# Patient Record
Sex: MALE | Race: OTHER | HISPANIC OR LATINO | URBAN - METROPOLITAN AREA
[De-identification: names, ages, dates, MRNs, and addresses within clinical notes are randomized per-mention and may not be internally consistent; named-entity substitution may affect disease eponyms.]

---

## 2023-07-13 ENCOUNTER — EMERGENCY (EMERGENCY)
Facility: HOSPITAL | Age: 38
LOS: 1 days | Discharge: HOME | End: 2023-07-13
Attending: EMERGENCY MEDICINE
Payer: MEDICAID

## 2023-07-13 VITALS
RESPIRATION RATE: 18 BRPM | DIASTOLIC BLOOD PRESSURE: 85 MMHG | OXYGEN SATURATION: 95 % | HEART RATE: 94 BPM | TEMPERATURE: 98 F | WEIGHT: 179.9 LBS | HEIGHT: 66 IN | SYSTOLIC BLOOD PRESSURE: 132 MMHG

## 2023-07-13 LAB
ALBUMIN SERPL ELPH-MCNC: 4.3 G/DL — SIGNIFICANT CHANGE UP (ref 3.3–5.2)
ALP SERPL-CCNC: 86 U/L — SIGNIFICANT CHANGE UP (ref 40–120)
ALT FLD-CCNC: 19 U/L — SIGNIFICANT CHANGE UP
ANION GAP SERPL CALC-SCNC: 12 MMOL/L — SIGNIFICANT CHANGE UP (ref 5–17)
AST SERPL-CCNC: 27 U/L — SIGNIFICANT CHANGE UP
BASOPHILS # BLD AUTO: 0.04 K/UL — SIGNIFICANT CHANGE UP (ref 0–0.2)
BASOPHILS NFR BLD AUTO: 0.3 % — SIGNIFICANT CHANGE UP (ref 0–2)
BILIRUB SERPL-MCNC: 0.8 MG/DL — SIGNIFICANT CHANGE UP (ref 0.4–2)
BUN SERPL-MCNC: 12 MG/DL — SIGNIFICANT CHANGE UP (ref 8–20)
CALCIUM SERPL-MCNC: 9.4 MG/DL — SIGNIFICANT CHANGE UP (ref 8.4–10.5)
CHLORIDE SERPL-SCNC: 102 MMOL/L — SIGNIFICANT CHANGE UP (ref 96–108)
CO2 SERPL-SCNC: 26 MMOL/L — SIGNIFICANT CHANGE UP (ref 22–29)
CREAT SERPL-MCNC: 0.93 MG/DL — SIGNIFICANT CHANGE UP (ref 0.5–1.3)
CRP SERPL-MCNC: 139 MG/L — HIGH
EGFR: 108 ML/MIN/1.73M2 — SIGNIFICANT CHANGE UP
EOSINOPHIL # BLD AUTO: 0.09 K/UL — SIGNIFICANT CHANGE UP (ref 0–0.5)
EOSINOPHIL NFR BLD AUTO: 0.6 % — SIGNIFICANT CHANGE UP (ref 0–6)
ERYTHROCYTE [SEDIMENTATION RATE] IN BLOOD: 26 MM/HR — HIGH (ref 0–20)
GLUCOSE SERPL-MCNC: 88 MG/DL — SIGNIFICANT CHANGE UP (ref 70–99)
HCT VFR BLD CALC: 43.1 % — SIGNIFICANT CHANGE UP (ref 39–50)
HGB BLD-MCNC: 14.8 G/DL — SIGNIFICANT CHANGE UP (ref 13–17)
IMM GRANULOCYTES NFR BLD AUTO: 0.6 % — SIGNIFICANT CHANGE UP (ref 0–0.9)
LACTATE BLDV-MCNC: 1.1 MMOL/L — SIGNIFICANT CHANGE UP (ref 0.5–2)
LYMPHOCYTES # BLD AUTO: 1.85 K/UL — SIGNIFICANT CHANGE UP (ref 1–3.3)
LYMPHOCYTES # BLD AUTO: 13.1 % — SIGNIFICANT CHANGE UP (ref 13–44)
MCHC RBC-ENTMCNC: 30.7 PG — SIGNIFICANT CHANGE UP (ref 27–34)
MCHC RBC-ENTMCNC: 34.3 GM/DL — SIGNIFICANT CHANGE UP (ref 32–36)
MCV RBC AUTO: 89.4 FL — SIGNIFICANT CHANGE UP (ref 80–100)
MONOCYTES # BLD AUTO: 1.1 K/UL — HIGH (ref 0–0.9)
MONOCYTES NFR BLD AUTO: 7.8 % — SIGNIFICANT CHANGE UP (ref 2–14)
NEUTROPHILS # BLD AUTO: 10.93 K/UL — HIGH (ref 1.8–7.4)
NEUTROPHILS NFR BLD AUTO: 77.6 % — HIGH (ref 43–77)
PLATELET # BLD AUTO: 236 K/UL — SIGNIFICANT CHANGE UP (ref 150–400)
POTASSIUM SERPL-MCNC: 4.4 MMOL/L — SIGNIFICANT CHANGE UP (ref 3.5–5.3)
POTASSIUM SERPL-SCNC: 4.4 MMOL/L — SIGNIFICANT CHANGE UP (ref 3.5–5.3)
PROT SERPL-MCNC: 7.4 G/DL — SIGNIFICANT CHANGE UP (ref 6.6–8.7)
RBC # BLD: 4.82 M/UL — SIGNIFICANT CHANGE UP (ref 4.2–5.8)
RBC # FLD: 12.8 % — SIGNIFICANT CHANGE UP (ref 10.3–14.5)
SODIUM SERPL-SCNC: 140 MMOL/L — SIGNIFICANT CHANGE UP (ref 135–145)
WBC # BLD: 14.09 K/UL — HIGH (ref 3.8–10.5)
WBC # FLD AUTO: 14.09 K/UL — HIGH (ref 3.8–10.5)

## 2023-07-13 PROCEDURE — 99223 1ST HOSP IP/OBS HIGH 75: CPT

## 2023-07-13 PROCEDURE — 73564 X-RAY EXAM KNEE 4 OR MORE: CPT | Mod: 26,LT

## 2023-07-13 RX ORDER — IBUPROFEN 200 MG
600 TABLET ORAL EVERY 6 HOURS
Refills: 0 | Status: DISCONTINUED | OUTPATIENT
Start: 2023-07-13 | End: 2023-07-20

## 2023-07-13 RX ORDER — AMPICILLIN SODIUM AND SULBACTAM SODIUM 250; 125 MG/ML; MG/ML
3 INJECTION, POWDER, FOR SUSPENSION INTRAMUSCULAR; INTRAVENOUS ONCE
Refills: 0 | Status: COMPLETED | OUTPATIENT
Start: 2023-07-13 | End: 2023-07-13

## 2023-07-13 RX ORDER — ACETAMINOPHEN 500 MG
650 TABLET ORAL EVERY 6 HOURS
Refills: 0 | Status: DISCONTINUED | OUTPATIENT
Start: 2023-07-13 | End: 2023-07-20

## 2023-07-13 RX ORDER — AMPICILLIN SODIUM AND SULBACTAM SODIUM 250; 125 MG/ML; MG/ML
3 INJECTION, POWDER, FOR SUSPENSION INTRAMUSCULAR; INTRAVENOUS EVERY 6 HOURS
Refills: 0 | Status: DISCONTINUED | OUTPATIENT
Start: 2023-07-14 | End: 2023-07-20

## 2023-07-13 RX ORDER — KETOROLAC TROMETHAMINE 30 MG/ML
30 SYRINGE (ML) INJECTION ONCE
Refills: 0 | Status: DISCONTINUED | OUTPATIENT
Start: 2023-07-13 | End: 2023-07-13

## 2023-07-13 RX ORDER — OXYCODONE HYDROCHLORIDE 5 MG/1
5 TABLET ORAL EVERY 8 HOURS
Refills: 0 | Status: DISCONTINUED | OUTPATIENT
Start: 2023-07-13 | End: 2023-07-15

## 2023-07-13 RX ORDER — AMPICILLIN SODIUM AND SULBACTAM SODIUM 250; 125 MG/ML; MG/ML
INJECTION, POWDER, FOR SUSPENSION INTRAMUSCULAR; INTRAVENOUS
Refills: 0 | Status: DISCONTINUED | OUTPATIENT
Start: 2023-07-13 | End: 2023-07-20

## 2023-07-13 RX ADMIN — AMPICILLIN SODIUM AND SULBACTAM SODIUM 3 GRAM(S): 250; 125 INJECTION, POWDER, FOR SUSPENSION INTRAMUSCULAR; INTRAVENOUS at 20:28

## 2023-07-13 RX ADMIN — Medication 30 MILLIGRAM(S): at 17:55

## 2023-07-13 RX ADMIN — AMPICILLIN SODIUM AND SULBACTAM SODIUM 200 GRAM(S): 250; 125 INJECTION, POWDER, FOR SUSPENSION INTRAMUSCULAR; INTRAVENOUS at 13:27

## 2023-07-13 RX ADMIN — AMPICILLIN SODIUM AND SULBACTAM SODIUM 200 GRAM(S): 250; 125 INJECTION, POWDER, FOR SUSPENSION INTRAMUSCULAR; INTRAVENOUS at 19:58

## 2023-07-13 RX ADMIN — Medication 650 MILLIGRAM(S): at 19:58

## 2023-07-13 RX ADMIN — OXYCODONE HYDROCHLORIDE 5 MILLIGRAM(S): 5 TABLET ORAL at 20:58

## 2023-07-13 RX ADMIN — Medication 30 MILLIGRAM(S): at 13:28

## 2023-07-13 RX ADMIN — AMPICILLIN SODIUM AND SULBACTAM SODIUM 3 GRAM(S): 250; 125 INJECTION, POWDER, FOR SUSPENSION INTRAMUSCULAR; INTRAVENOUS at 19:17

## 2023-07-13 RX ADMIN — OXYCODONE HYDROCHLORIDE 5 MILLIGRAM(S): 5 TABLET ORAL at 19:58

## 2023-07-13 RX ADMIN — Medication 650 MILLIGRAM(S): at 20:58

## 2023-07-13 NOTE — ED PROVIDER NOTE - CLINICAL SUMMARY MEDICAL DECISION MAKING FREE TEXT BOX
37 yo M no PMHx presents for L knee pain and swelling x 2 days. Pt is well appearing, afebrile and is able to ambulate. No visual defects or trauma seen to the L knee. Less concern for septic arthritis as pt has ROM. Clinical picture and exam more likely for septic bursitis. Will order full labs, Xray L knee, IV abx, and considering observation. Will cont to observe. 39 yo M no PMHx presents for L knee pain and swelling x 2 days. Pt is well appearing, afebrile and is able to ambulate. Less concern for septic arthritis as pt without hardware/previous surgery, is ambulatory and has range of motion. Clinical picture and exam more likely for septic bursitis. Will evaluate with labs, Xray L knee, IV abx, and consider obs 39 yo M no PMHx presents for L knee pain and swelling x 2 days. Pt is well appearing, afebrile and is able to ambulate. Less concern for septic arthritis as pt without hardware/previous surgery, is ambulatory and has range of motion. Clinical picture and exam more likely for septic bursitis. Will evaluate with labs, Xray L knee, IV abx, and consider obs. leukocytosis 14k, otherwise labs without significant findings. XR reviewed. given dose unasyn in ED. ortho consulted, agrees with plan for overnight iv abx. pt agreeable to stay in obs for further management

## 2023-07-13 NOTE — CONSULT NOTE ADULT - SUBJECTIVE AND OBJECTIVE BOX
Pt Name: RAPHAEL MAYO    MRN: [id number]      Patient is a 38y Male presenting to the emergency department with a chief complaint of left knee pain. Symptoms began over the last 3 days. Patient denies any trauma or inciting event. No prior history of knee problems. Patient works as a  and spends a lot of time on his knees. Patient complains of significant pain, however he was able to walk from waiting room to his current location in the ED. Associated symptoms include redness, swelling, and stiffness. No additional orthopedic complaints are expressed at this time. Patient denies fever, chills, malaise, numbness or weakness.    Luxembourger interpretation provided by ED .       REVIEW OF SYSTEMS    Musculoskeletal: SEE HPI.    Neurological: No sensory or motor changes.     ROS is otherwise negative.    PAST MEDICAL & SURGICAL HISTORY:    No pertinent past medical history      No significant past surgical history          Allergies: No Known Allergies      Medications:     FAMILY HISTORY:  No pertinent family history in first degree relatives    : non-contributory    Social History:     Ambulation: Walking independently.                          14.8   14.09 )-----------( 236      ( 13 Jul 2023 12:30 )             43.1       07-13    140  |  102  |  12.0  ----------------------------<  88  4.4   |  26.0  |  0.93    Ca    9.4      13 Jul 2023 12:30    TPro  7.4  /  Alb  4.3  /  TBili  0.8  /  DBili  x   /  AST  27  /  ALT  19  /  AlkPhos  86  07-13      Vital Signs Last 24 Hrs  T(C): 36.4 (13 Jul 2023 09:49), Max: 36.4 (13 Jul 2023 09:49)  T(F): 97.6 (13 Jul 2023 09:49), Max: 97.6 (13 Jul 2023 09:49)  HR: 94 (13 Jul 2023 09:49) (94 - 94)  BP: 132/85 (13 Jul 2023 09:49) (132/85 - 132/85)  BP(mean): --  RR: 18 (13 Jul 2023 09:49) (18 - 18)  SpO2: 95% (13 Jul 2023 09:49) (95% - 95%)    Parameters below as of 13 Jul 2023 09:49  Patient On (Oxygen Delivery Method): room air        Daily Height in cm: 167.64 (13 Jul 2023 09:49)    Daily       PHYSICAL EXAM:      Appearance: Alert, responsive, in no acute distress.    Musculoskeletal:         Left Lower Extremity: skin warm and intact. +Erythema overlying anterior knee. No deformity or ecchymosis. Mild swelling noted overlying pre-patellar bursa. Global TTP about knee. ROM: 0-90. Negative log roll. No appreciable effusion. SILT. +TA/GSC/EHL/FHL. BCR.       Imaging Studies: No acute fractures or dislocations.     A/P: Pt is a 38y Male with septic pre-patellar bursitis.     PLAN:   -Pain control as needed.  -WBAT.  -Imaging reviewed.  -Recommend admission to CDU form observation and antibiotics.   -IV Abx per ED.   -Consider ID consult.   -Arthrocentesis not indicated at this time.   -Will continue to monitor.   -Obtain CT or US to eval for fluid collection if no improvement despite the above,  -Case d/w Dr. Abdi.        Pt Name: RAPHAEL MAYO    MRN: [id number]      Patient is a 38y Male presenting to the emergency department with a chief complaint of left knee pain. Symptoms began over the last 3 days. Patient denies any trauma or inciting event. No prior history of knee problems. Patient works as a  and spends a lot of time on his knees. Patient complains of significant pain, however he was able to walk from waiting room to his current location in the ED. Associated symptoms include redness, swelling, and stiffness. No additional orthopedic complaints are expressed at this time. Patient denies fever, chills, malaise, numbness or weakness.    Faroese interpretation provided by ED .       REVIEW OF SYSTEMS    Musculoskeletal: SEE HPI.    Neurological: No sensory or motor changes.     ROS is otherwise negative.    PAST MEDICAL & SURGICAL HISTORY:    No pertinent past medical history      No significant past surgical history          Allergies: No Known Allergies      Medications:     FAMILY HISTORY:  No pertinent family history in first degree relatives    : non-contributory    Social History:     Ambulation: Walking independently.                          14.8   14.09 )-----------( 236      ( 13 Jul 2023 12:30 )             43.1       07-13    140  |  102  |  12.0  ----------------------------<  88  4.4   |  26.0  |  0.93    Ca    9.4      13 Jul 2023 12:30    TPro  7.4  /  Alb  4.3  /  TBili  0.8  /  DBili  x   /  AST  27  /  ALT  19  /  AlkPhos  86  07-13      Vital Signs Last 24 Hrs  T(C): 36.4 (13 Jul 2023 09:49), Max: 36.4 (13 Jul 2023 09:49)  T(F): 97.6 (13 Jul 2023 09:49), Max: 97.6 (13 Jul 2023 09:49)  HR: 94 (13 Jul 2023 09:49) (94 - 94)  BP: 132/85 (13 Jul 2023 09:49) (132/85 - 132/85)  BP(mean): --  RR: 18 (13 Jul 2023 09:49) (18 - 18)  SpO2: 95% (13 Jul 2023 09:49) (95% - 95%)    Parameters below as of 13 Jul 2023 09:49  Patient On (Oxygen Delivery Method): room air        Daily Height in cm: 167.64 (13 Jul 2023 09:49)    Daily       PHYSICAL EXAM:      Appearance: Alert, responsive, in no acute distress.    Musculoskeletal:         Left Lower Extremity: skin warm and intact. +Erythema overlying anterior knee. No deformity or ecchymosis. Mild swelling noted overlying pre-patellar bursa. Global TTP about knee. ROM: 0-90. Negative log roll. No appreciable effusion. SILT. +TA/GSC/EHL/FHL. BCR.       Imaging Studies: No acute fractures or dislocations.     A/P: Pt is a 38y Male with septic pre-patellar bursitis.     PLAN:   -Pain control as needed.  -WBAT.  -Imaging reviewed.  -Recommend admission to CDU form observation and antibiotics.   -IV Abx per ED.   -Consider ID consult.   -Arthrocentesis not indicated at this time.   -Will continue to monitor.   -Obtain CT or US to eval for fluid collection if no improvement despite the above.  -Trend ESR/CRP.  -Case d/w Dr. Abdi.

## 2023-07-13 NOTE — ED CDU PROVIDER INITIAL DAY NOTE - CLINICAL SUMMARY MEDICAL DECISION MAKING FREE TEXT BOX
Pt presenting for knee pain. Found to have likely septic bursitis of the pre patellar bursa. No drainage performed. Septic arthritis less likely given pt able to range between extremes. Will continue antibiotics.

## 2023-07-13 NOTE — ED PROVIDER NOTE - PHYSICAL EXAMINATION
GEN: CLARENCE, well appearing   HEENT: NC/AT, PERRLA  NECK: Trachea midline   RESP: CTAB  CV: RRR  ABD: soft, nontender, nondistended   EXT: +localized L knee edema, +tenderness to palpation L knee, no abrasions, rashes, lesions, equal strength B/L UE and LE   NEURO: Intact motor and sensory sensation B/L UE AND LE  SKIN: +L knee erythema and warmth, clean and dry, no rashes, lesions Gen: No acute distress, non toxic  HENT: NCAT, Mucous membranes moist  Eyes: pink conjunctivae, EOMI, PERRL  CV: RRR, nl s1/s2.  Resp: CTAB, normal rate and effort  Neuro: A&O x 3, sensorimotor intact without deficits   MSK: +Swelling to anterior L knee. Able to fully extend LLE @ knee, +Pain with knee flexion beyond 30 degrees. +TTP anterior knee. compartments soft/compressible. FWB and ambulating   Skin: +Erythema and warmth to anterior and medial L knee

## 2023-07-13 NOTE — ED CDU PROVIDER INITIAL DAY NOTE - ATTENDING CONTRIBUTION TO CARE
Patient in obs for septic pre patellar bursitis in need of IV abx and wound monitoring.   present for discussions with patient. VSS.  NV intact.  will continue to treat and monitor.  Non toxic.  Well appearing. Uneventful ED observation period.

## 2023-07-13 NOTE — ED ADULT NURSE REASSESSMENT NOTE - NS ED NURSE REASSESS COMMENT FT1
C/p assumed from Dona FIGUEROA @1915. no S&S of acute distress, pt resting comfortably on stretcher. pt denies CP/pressure/tightness, palpitations, SOB/dyspnea, dizziness/headache/lightheadedness/blurry vision, tingling/numbness, fevers/chills, N/V/D, urinary S&S. c/o pain to left knee. awaiting pain medication orders. left knee noted to be reddened, warm, and swollen since yesterday morning. as per pt, the pain began two day. pain is described pulsating, but after the anitbitoic the pain has improved. awaiting continuation of iv antibiotics. plan of care reviewed with pt and Language Line #[id number]. pt in understanding of plan of care.

## 2023-07-13 NOTE — ED CDU PROVIDER INITIAL DAY NOTE - PHYSICAL EXAMINATION
General: well appearing, NAD  Head: NC, AT  EENT: EOMI, no scleral icterus  Cardiac: RRR, no apparent murmurs, no lower extremity edema  Respiratory: CTABL, no respiratory distress   Abdomen: soft, ND, NT, nonperitonitic  MSK/Vascular: full ROM, soft compartments, warm extremities, ROM limited by pain at the extremes of movement  Neuro: AAOx3, sensation to light touch intact  Psych: calm, cooperative

## 2023-07-13 NOTE — CONSULT NOTE ADULT - NS ATTEND AMEND GEN_ALL_CORE FT
Orthopaedic Trauma Surgeon Addendum:    I have reviewed the physician assistant note and agree with the history, exam, and plan of care, except as noted.    Ortho will continue to follow. Please call with questions.    Venkata Abdi MD  Orthopaedic Trauma Surgeon  [facility] [place]

## 2023-07-13 NOTE — ED PROVIDER NOTE - ATTENDING CONTRIBUTION TO CARE
significant prepatellar bursitis with assoc cellulitis streaking down leg; no fever; +erythema over patellar region and assoc streaking erythema to lower leg; +ttp; no joint effusion; limited ROM due to pain; +neurovasc intact; agree with acp plan of care    This was a shared visit with SAMIR. I reviewed and verified the documentation and independently performed the documented history/exam/mdm.

## 2023-07-13 NOTE — ED CDU PROVIDER INITIAL DAY NOTE - OBJECTIVE STATEMENT
38 y m with no pmh presenting for left knee pain. Started 2 days ago with just pain and pt had progressively worsening redness and swelling. Redness is now encompassing an area with diameter of 6 in at the left anteromedial knee. Pt denies fever, chills, nausea, vomiting, abdominal pain, lightheadedness. Pt has pain with ROM but predominantly at the extremes. Pt was seen by ortho 38 y m with no pmh presenting for left knee pain. Started 2 days ago with just pain and pt had progressively worsening redness and swelling. Redness is now encompassing an area with diameter of 6 in at the left anteromedial knee. Pt denies fever, chills, nausea, vomiting, abdominal pain, lightheadedness. Pt has pain with ROM but predominantly at the extremes. Labs remarkable for WBC of 14, crp of 139, and sed rate of 26. Lactate 1.1. Knee xrays shows swelling in anterior patellar region. Pt has seen by ortho. No intervention beyond antibiotics at this time. Pt has received unasyn x1.

## 2023-07-13 NOTE — ED PROVIDER NOTE - OBJECTIVE STATEMENT
39 yo M no PMHx presents for L knee pain and swelling x 2 days. Pt states he woke up with nonradiating, throbbing L knee pain and swelling 2 days ago that progressively worsened to today to 9/10 worsened by walking/bending the knee and improvement with laying down. Pt tried a OTC RA drug with no improvement. Has never experienced this prior. Pt stayed home from work as a  yesterday due to pain. Denies any trauma. Denies recent travel, fever, chills, HA, visual changes, CP, SOB, abd pain, N/V, urinary changes. 39 yo M no PMHx presents to ED c/o L knee pain and swelling x 2 days. Pt states he woke up with nonradiating, throbbing L knee pain and swelling 2 days ago that progressively worsened to today to 9/10 worsened by walking/bending the knee and improvement with laying down. Reports redness to knee that began this morning. Pt tried a OTC rheumatoid arthritis drug (unsure of name) with no improvement. Has never experienced this prior. Pt stayed home from work as a  yesterday due to pain. Denies any trauma. Denies recent travel, fever, chills, HA, visual changes, CP, SOB, abd pain, N/V, urinary changes, calf pain/swelling.  : Jayson Rashid

## 2023-07-13 NOTE — ED PROVIDER NOTE - NS ED ATTENDING STATEMENT MOD
I have seen and examined this patient and fully participated in the care of this patient as the teaching attending.  The service was shared with the SAMIR.  I reviewed and verified the documentation and independently performed the documented:

## 2023-07-13 NOTE — ED CDU PROVIDER INITIAL DAY NOTE - NS ED ROS FT
Constitutional: no fever, no chills  Head: NC, AT   Eyes: no redness   ENMT: no nasal congestion/drainage, no sore throat   CV: no chest pain, no edema  Resp: no cough, no dyspnea  GI: no abdominal pain, no nausea, no vomiting, no diarrhea  : no dysuria, no hematuria   Skin: no lesions, no rashes, warmth and redness on anterolateral left knee  Neuro: no LOC, no headache, no sensory deficits, no weakness

## 2023-07-14 LAB
BASOPHILS # BLD AUTO: 0.05 K/UL — SIGNIFICANT CHANGE UP (ref 0–0.2)
BASOPHILS NFR BLD AUTO: 0.4 % — SIGNIFICANT CHANGE UP (ref 0–2)
CRP SERPL-MCNC: 147 MG/L — HIGH
EOSINOPHIL # BLD AUTO: 0.21 K/UL — SIGNIFICANT CHANGE UP (ref 0–0.5)
EOSINOPHIL NFR BLD AUTO: 1.6 % — SIGNIFICANT CHANGE UP (ref 0–6)
ERYTHROCYTE [SEDIMENTATION RATE] IN BLOOD: 42 MM/HR — HIGH (ref 0–20)
HCT VFR BLD CALC: 38.8 % — LOW (ref 39–50)
HGB BLD-MCNC: 13.4 G/DL — SIGNIFICANT CHANGE UP (ref 13–17)
IMM GRANULOCYTES NFR BLD AUTO: 0.5 % — SIGNIFICANT CHANGE UP (ref 0–0.9)
LYMPHOCYTES # BLD AUTO: 18.6 % — SIGNIFICANT CHANGE UP (ref 13–44)
LYMPHOCYTES # BLD AUTO: 2.41 K/UL — SIGNIFICANT CHANGE UP (ref 1–3.3)
MCHC RBC-ENTMCNC: 31 PG — SIGNIFICANT CHANGE UP (ref 27–34)
MCHC RBC-ENTMCNC: 34.5 GM/DL — SIGNIFICANT CHANGE UP (ref 32–36)
MCV RBC AUTO: 89.8 FL — SIGNIFICANT CHANGE UP (ref 80–100)
MONOCYTES # BLD AUTO: 1.19 K/UL — HIGH (ref 0–0.9)
MONOCYTES NFR BLD AUTO: 9.2 % — SIGNIFICANT CHANGE UP (ref 2–14)
NEUTROPHILS # BLD AUTO: 9.03 K/UL — HIGH (ref 1.8–7.4)
NEUTROPHILS NFR BLD AUTO: 69.7 % — SIGNIFICANT CHANGE UP (ref 43–77)
PLATELET # BLD AUTO: 216 K/UL — SIGNIFICANT CHANGE UP (ref 150–400)
RBC # BLD: 4.32 M/UL — SIGNIFICANT CHANGE UP (ref 4.2–5.8)
RBC # FLD: 12.8 % — SIGNIFICANT CHANGE UP (ref 10.3–14.5)
WBC # BLD: 12.96 K/UL — HIGH (ref 3.8–10.5)
WBC # FLD AUTO: 12.96 K/UL — HIGH (ref 3.8–10.5)

## 2023-07-14 PROCEDURE — 99232 SBSQ HOSP IP/OBS MODERATE 35: CPT

## 2023-07-14 PROCEDURE — 99233 SBSQ HOSP IP/OBS HIGH 50: CPT

## 2023-07-14 RX ADMIN — Medication 650 MILLIGRAM(S): at 20:35

## 2023-07-14 RX ADMIN — AMPICILLIN SODIUM AND SULBACTAM SODIUM 3 GRAM(S): 250; 125 INJECTION, POWDER, FOR SUSPENSION INTRAMUSCULAR; INTRAVENOUS at 02:20

## 2023-07-14 RX ADMIN — AMPICILLIN SODIUM AND SULBACTAM SODIUM 200 GRAM(S): 250; 125 INJECTION, POWDER, FOR SUSPENSION INTRAMUSCULAR; INTRAVENOUS at 07:52

## 2023-07-14 RX ADMIN — AMPICILLIN SODIUM AND SULBACTAM SODIUM 200 GRAM(S): 250; 125 INJECTION, POWDER, FOR SUSPENSION INTRAMUSCULAR; INTRAVENOUS at 23:15

## 2023-07-14 RX ADMIN — AMPICILLIN SODIUM AND SULBACTAM SODIUM 200 GRAM(S): 250; 125 INJECTION, POWDER, FOR SUSPENSION INTRAMUSCULAR; INTRAVENOUS at 12:21

## 2023-07-14 RX ADMIN — Medication 650 MILLIGRAM(S): at 19:25

## 2023-07-14 RX ADMIN — Medication 600 MILLIGRAM(S): at 01:50

## 2023-07-14 RX ADMIN — AMPICILLIN SODIUM AND SULBACTAM SODIUM 3 GRAM(S): 250; 125 INJECTION, POWDER, FOR SUSPENSION INTRAMUSCULAR; INTRAVENOUS at 08:22

## 2023-07-14 RX ADMIN — OXYCODONE HYDROCHLORIDE 5 MILLIGRAM(S): 5 TABLET ORAL at 17:36

## 2023-07-14 RX ADMIN — AMPICILLIN SODIUM AND SULBACTAM SODIUM 200 GRAM(S): 250; 125 INJECTION, POWDER, FOR SUSPENSION INTRAMUSCULAR; INTRAVENOUS at 17:30

## 2023-07-14 RX ADMIN — Medication 600 MILLIGRAM(S): at 02:50

## 2023-07-14 RX ADMIN — AMPICILLIN SODIUM AND SULBACTAM SODIUM 200 GRAM(S): 250; 125 INJECTION, POWDER, FOR SUSPENSION INTRAMUSCULAR; INTRAVENOUS at 01:50

## 2023-07-14 RX ADMIN — OXYCODONE HYDROCHLORIDE 5 MILLIGRAM(S): 5 TABLET ORAL at 17:29

## 2023-07-14 NOTE — ED ADULT NURSE REASSESSMENT NOTE - NS ED NURSE REASSESS COMMENT FT1
Patient A&O4, febrilex1 (tempt 100.3), PO tylenol and cool fluids offered, IV antibiotics maintained. Temperature normal at this time. Provided education regarding antibiotics, as patient was inquiring. has crutches at bedside, reports able to bear partial weight due to left knee swelling. call bell within reach, patient needs met, VSS, all safety precautions maintained.

## 2023-07-14 NOTE — ED ADULT NURSE REASSESSMENT NOTE - NS ED NURSE REASSESS COMMENT FT1
Assumed care of pt at 1427 as stated in report from RN MT Charting as noted. Patient A&O x4,denies pain/discomfort, denies CP/SOB. Updated on the plan of care. Call bell within reach, bed locked in lowest position. IV site flushed w/ NS. No redness, swelling or pain noted to site. No signs of acute distress noted, safety maintained. Pt remains on CM in NSR, RR Even and unlabored. PT comfortable at this time

## 2023-07-14 NOTE — PROGRESS NOTE ADULT - NS ATTEND AMEND GEN_ALL_CORE FT
Orthopaedic Trauma Surgeon Addendum:    I have reviewed the physician assistant note and agree with the history, exam, and plan of care, except as noted.    Cellulitis improving. Continue abx per ED or ID    Venkata Abdi MD  Orthopaedic Trauma Surgeon  [facility] [place]

## 2023-07-14 NOTE — ED ADULT NURSE REASSESSMENT NOTE - NS ED NURSE REASSESS COMMENT FT1
Patient received at 0730; awake; alert and oriented x4. Denies SOB, dizziness, chest pain, knee pain. No distress noted. VSS. Respirations unlabored. Report received at bedside. Call bell and personal items in reach. Continue to monitor patient and maintain safety. IV patent and flushing without difficulty, no signs of infiltration.

## 2023-07-14 NOTE — ED ADULT NURSE REASSESSMENT NOTE - NS ED NURSE REASSESS COMMENT FT1
pt resting comfortably on stretcher with eyes closed. no acute distress, rounds frequently provided for pt comfort and safety. c/o pain 3/10 to left knee, medicated as per EMR. awaiting continuation of iv antibiotics.

## 2023-07-14 NOTE — ED ADULT NURSE REASSESSMENT NOTE - NSFALLHARMRISKINTERV_ED_ALL_ED

## 2023-07-15 PROCEDURE — 99232 SBSQ HOSP IP/OBS MODERATE 35: CPT

## 2023-07-15 RX ADMIN — OXYCODONE HYDROCHLORIDE 5 MILLIGRAM(S): 5 TABLET ORAL at 08:16

## 2023-07-15 RX ADMIN — Medication 650 MILLIGRAM(S): at 04:48

## 2023-07-15 RX ADMIN — AMPICILLIN SODIUM AND SULBACTAM SODIUM 3 GRAM(S): 250; 125 INJECTION, POWDER, FOR SUSPENSION INTRAMUSCULAR; INTRAVENOUS at 14:07

## 2023-07-15 RX ADMIN — AMPICILLIN SODIUM AND SULBACTAM SODIUM 200 GRAM(S): 250; 125 INJECTION, POWDER, FOR SUSPENSION INTRAMUSCULAR; INTRAVENOUS at 12:03

## 2023-07-15 RX ADMIN — Medication 650 MILLIGRAM(S): at 16:56

## 2023-07-15 RX ADMIN — OXYCODONE HYDROCHLORIDE 5 MILLIGRAM(S): 5 TABLET ORAL at 09:59

## 2023-07-15 RX ADMIN — Medication 650 MILLIGRAM(S): at 06:42

## 2023-07-15 RX ADMIN — AMPICILLIN SODIUM AND SULBACTAM SODIUM 200 GRAM(S): 250; 125 INJECTION, POWDER, FOR SUSPENSION INTRAMUSCULAR; INTRAVENOUS at 23:56

## 2023-07-15 RX ADMIN — AMPICILLIN SODIUM AND SULBACTAM SODIUM 200 GRAM(S): 250; 125 INJECTION, POWDER, FOR SUSPENSION INTRAMUSCULAR; INTRAVENOUS at 18:27

## 2023-07-15 RX ADMIN — Medication 650 MILLIGRAM(S): at 15:43

## 2023-07-15 RX ADMIN — Medication 100 MILLIGRAM(S): at 18:05

## 2023-07-15 RX ADMIN — OXYCODONE HYDROCHLORIDE 5 MILLIGRAM(S): 5 TABLET ORAL at 23:59

## 2023-07-15 RX ADMIN — AMPICILLIN SODIUM AND SULBACTAM SODIUM 3 GRAM(S): 250; 125 INJECTION, POWDER, FOR SUSPENSION INTRAMUSCULAR; INTRAVENOUS at 06:06

## 2023-07-15 RX ADMIN — AMPICILLIN SODIUM AND SULBACTAM SODIUM 200 GRAM(S): 250; 125 INJECTION, POWDER, FOR SUSPENSION INTRAMUSCULAR; INTRAVENOUS at 05:06

## 2023-07-15 RX ADMIN — Medication 100 MILLIGRAM(S): at 17:00

## 2023-07-15 NOTE — ED ADULT NURSE REASSESSMENT NOTE - NSFALLUNIVINTERV_ED_ALL_ED
Bed/Stretcher in lowest position, wheels locked, appropriate side rails in place/Call bell, personal items and telephone in reach/Instruct patient to call for assistance before getting out of bed/chair/stretcher/Non-slip footwear applied when patient is off stretcher/[place] to call system/Physically safe environment - no spills, clutter or unnecessary equipment/Purposeful proactive rounding/Room/bathroom lighting operational, light cord in reach

## 2023-07-15 NOTE — ED CDU PROVIDER SUBSEQUENT DAY NOTE - NS ED MD PROGRESS NOTE PROVIDER NAME FT
MEDICATIONS  (STANDING):  chlorhexidine 4% Liquid 1 Application(s) Topical <User Schedule>  enoxaparin Injectable 40 milliGRAM(s) SubCutaneous every 24 hours  lactated ringers. 1000 milliLiter(s) (100 mL/Hr) IV Continuous <Continuous>  ondansetron Injectable 4 milliGRAM(s) IV Push once  pantoprazole   Suspension 40 milliGRAM(s) Oral two times a day  polyethylene glycol 3350 17 Gram(s) Oral daily  senna 2 Tablet(s) Oral at bedtime  topiramate 300 milliGRAM(s) Oral daily    MEDICATIONS  (PRN):  acetaminophen     Tablet .. 650 milliGRAM(s) Oral every 6 hours PRN Mild Pain (1 - 3)  aluminum hydroxide/magnesium hydroxide/simethicone Suspension 30 milliLiter(s) Oral every 6 hours PRN Dyspepsia  ibuprofen  Tablet. 600 milliGRAM(s) Oral every 6 hours PRN Temp greater or equal to 38C (100.4F), Mild Pain (1 - 3)  ondansetron Injectable 4 milliGRAM(s) IV Push every 8 hours PRN Nausea and/or Vomiting  sucralfate 1 Gram(s) Oral four times a day PRN dyspepsia  
[place]
RODO Lay

## 2023-07-15 NOTE — ED ADULT NURSE REASSESSMENT NOTE - NS ED NURSE REASSESS COMMENT FT1
Assumed care of the patient at 0730. Verbal report received from Kailee RN obs. Patient A&Ox4. Patient with persistent pain, swelling, redness and warmth to touch to left knee, medicated for pain, will re-eval response. VSS, afebrile. NSR on CM. PIV patent. Patient pending IV abx and re-eval. Patient in understanding of plan of care. Patient with no further questions for the RN. Resting in comfort. Call bell within reach and encouraged to use when assistance needed. Will continue to monitor. Assumed care of the patient at 0730. Verbal report received from Kailee RN obs. Patient A&Ox4. Patient with persistent pain, swelling, redness and warmth to touch to left knee, medicated for pain, will re-eval response. VSS, afebrile. LLE remains elevated on the pillows as per orders. NSR on CM. PIV patent. Patient pending IV abx and re-eval. Patient in understanding of plan of care. Patient with no further questions for the RN. Resting in comfort. Call bell within reach and encouraged to use when assistance needed. Will continue to monitor. Assumed care of the patient at 0730. Verbal report received from Kailee FIGUEROA obs. Patient A&Ox4, Occitan speaking only, Tayo the  at the bedside for translation. Patient with persistent pain, swelling, redness and warmth to touch to left knee, medicated for pain, will re-eval response. VSS, afebrile. LLE remains elevated on the pillows as per orders. NSR on CM. PIV patent. Patient pending IV abx and re-eval. Patient in understanding of plan of care. Patient with no further questions for the RN. Resting in comfort. Call bell within reach and encouraged to use when assistance needed. Will continue to monitor.

## 2023-07-15 NOTE — ED CDU PROVIDER SUBSEQUENT DAY NOTE - PROGRESS NOTE DETAILS
Pt with bursitis of the left knee, on iv abx, seen by chelly who recommending continue on iv abx and f/u in am
pt re-assessed, still reporting pain to L knee. Erythema appears to be improving. will add doxy and continue abx  : Tayo Reddy

## 2023-07-15 NOTE — ED ADULT NURSE REASSESSMENT NOTE - SYMPTOMS
none
swelling, redness, warmth to touch to left knee/pain:

## 2023-07-15 NOTE — ED ADULT NURSE REASSESSMENT NOTE - NS ED NURSE REASSESS COMMENT FT1
Patient received A&Ox4. Does not appear to be in physical distress at this time. Will continue to monitor, maintain safety, and provide support as needed.

## 2023-07-15 NOTE — ED ADULT NURSE REASSESSMENT NOTE - NSFALLRISKINTERV_ED_ALL_ED

## 2023-07-15 NOTE — ED ADULT NURSE REASSESSMENT NOTE - COMFORT CARE
plan of care explained
ambulated to bathroom/meal provided/plan of care explained/po fluids offered/repositioned/wait time explained

## 2023-07-15 NOTE — ED ADULT NURSE REASSESSMENT NOTE - NURSING MUSC EXTREMITY NORMAL ROM
left upper extremity/right upper extremity/right lower extremity
left upper extremity/right upper extremity/right lower extremity

## 2023-07-16 VITALS
TEMPERATURE: 98 F | OXYGEN SATURATION: 98 % | DIASTOLIC BLOOD PRESSURE: 68 MMHG | HEART RATE: 68 BPM | RESPIRATION RATE: 18 BRPM | SYSTOLIC BLOOD PRESSURE: 106 MMHG

## 2023-07-16 LAB
ANION GAP SERPL CALC-SCNC: 10 MMOL/L — SIGNIFICANT CHANGE UP (ref 5–17)
BASOPHILS # BLD AUTO: 0.04 K/UL — SIGNIFICANT CHANGE UP (ref 0–0.2)
BASOPHILS NFR BLD AUTO: 0.5 % — SIGNIFICANT CHANGE UP (ref 0–2)
BUN SERPL-MCNC: 13.2 MG/DL — SIGNIFICANT CHANGE UP (ref 8–20)
CALCIUM SERPL-MCNC: 9.5 MG/DL — SIGNIFICANT CHANGE UP (ref 8.4–10.5)
CHLORIDE SERPL-SCNC: 101 MMOL/L — SIGNIFICANT CHANGE UP (ref 96–108)
CO2 SERPL-SCNC: 29 MMOL/L — SIGNIFICANT CHANGE UP (ref 22–29)
CREAT SERPL-MCNC: 1.06 MG/DL — SIGNIFICANT CHANGE UP (ref 0.5–1.3)
CRP SERPL-MCNC: 142 MG/L — HIGH
EGFR: 93 ML/MIN/1.73M2 — SIGNIFICANT CHANGE UP
EOSINOPHIL # BLD AUTO: 0.2 K/UL — SIGNIFICANT CHANGE UP (ref 0–0.5)
EOSINOPHIL NFR BLD AUTO: 2.3 % — SIGNIFICANT CHANGE UP (ref 0–6)
ERYTHROCYTE [SEDIMENTATION RATE] IN BLOOD: 42 MM/HR — HIGH (ref 0–20)
GLUCOSE SERPL-MCNC: 109 MG/DL — HIGH (ref 70–99)
HCT VFR BLD CALC: 39.9 % — SIGNIFICANT CHANGE UP (ref 39–50)
HGB BLD-MCNC: 13.3 G/DL — SIGNIFICANT CHANGE UP (ref 13–17)
IMM GRANULOCYTES NFR BLD AUTO: 0.8 % — SIGNIFICANT CHANGE UP (ref 0–0.9)
LYMPHOCYTES # BLD AUTO: 2.27 K/UL — SIGNIFICANT CHANGE UP (ref 1–3.3)
LYMPHOCYTES # BLD AUTO: 25.6 % — SIGNIFICANT CHANGE UP (ref 13–44)
MCHC RBC-ENTMCNC: 29.9 PG — SIGNIFICANT CHANGE UP (ref 27–34)
MCHC RBC-ENTMCNC: 33.3 GM/DL — SIGNIFICANT CHANGE UP (ref 32–36)
MCV RBC AUTO: 89.7 FL — SIGNIFICANT CHANGE UP (ref 80–100)
MONOCYTES # BLD AUTO: 0.65 K/UL — SIGNIFICANT CHANGE UP (ref 0–0.9)
MONOCYTES NFR BLD AUTO: 7.3 % — SIGNIFICANT CHANGE UP (ref 2–14)
NEUTROPHILS # BLD AUTO: 5.64 K/UL — SIGNIFICANT CHANGE UP (ref 1.8–7.4)
NEUTROPHILS NFR BLD AUTO: 63.5 % — SIGNIFICANT CHANGE UP (ref 43–77)
PLATELET # BLD AUTO: 269 K/UL — SIGNIFICANT CHANGE UP (ref 150–400)
POTASSIUM SERPL-MCNC: 4.9 MMOL/L — SIGNIFICANT CHANGE UP (ref 3.5–5.3)
POTASSIUM SERPL-SCNC: 4.9 MMOL/L — SIGNIFICANT CHANGE UP (ref 3.5–5.3)
RBC # BLD: 4.45 M/UL — SIGNIFICANT CHANGE UP (ref 4.2–5.8)
RBC # FLD: 12.2 % — SIGNIFICANT CHANGE UP (ref 10.3–14.5)
SODIUM SERPL-SCNC: 140 MMOL/L — SIGNIFICANT CHANGE UP (ref 135–145)
WBC # BLD: 8.87 K/UL — SIGNIFICANT CHANGE UP (ref 3.8–10.5)
WBC # FLD AUTO: 8.87 K/UL — SIGNIFICANT CHANGE UP (ref 3.8–10.5)

## 2023-07-16 PROCEDURE — 85025 COMPLETE CBC W/AUTO DIFF WBC: CPT

## 2023-07-16 PROCEDURE — 73564 X-RAY EXAM KNEE 4 OR MORE: CPT

## 2023-07-16 PROCEDURE — 83605 ASSAY OF LACTIC ACID: CPT

## 2023-07-16 PROCEDURE — 96375 TX/PRO/DX INJ NEW DRUG ADDON: CPT

## 2023-07-16 PROCEDURE — 96366 THER/PROPH/DIAG IV INF ADDON: CPT

## 2023-07-16 PROCEDURE — 85652 RBC SED RATE AUTOMATED: CPT

## 2023-07-16 PROCEDURE — 80048 BASIC METABOLIC PNL TOTAL CA: CPT

## 2023-07-16 PROCEDURE — G0378: CPT

## 2023-07-16 PROCEDURE — T1013: CPT

## 2023-07-16 PROCEDURE — 96367 TX/PROPH/DG ADDL SEQ IV INF: CPT

## 2023-07-16 PROCEDURE — 99284 EMERGENCY DEPT VISIT MOD MDM: CPT | Mod: 25

## 2023-07-16 PROCEDURE — 36415 COLL VENOUS BLD VENIPUNCTURE: CPT

## 2023-07-16 PROCEDURE — 96376 TX/PRO/DX INJ SAME DRUG ADON: CPT

## 2023-07-16 PROCEDURE — 80053 COMPREHEN METABOLIC PANEL: CPT

## 2023-07-16 PROCEDURE — 96365 THER/PROPH/DIAG IV INF INIT: CPT

## 2023-07-16 PROCEDURE — 87040 BLOOD CULTURE FOR BACTERIA: CPT

## 2023-07-16 PROCEDURE — 86140 C-REACTIVE PROTEIN: CPT

## 2023-07-16 PROCEDURE — 99238 HOSP IP/OBS DSCHRG MGMT 30/<: CPT

## 2023-07-16 RX ADMIN — Medication 650 MILLIGRAM(S): at 06:05

## 2023-07-16 RX ADMIN — Medication 650 MILLIGRAM(S): at 05:54

## 2023-07-16 RX ADMIN — Medication 100 MILLIGRAM(S): at 05:11

## 2023-07-16 RX ADMIN — OXYCODONE HYDROCHLORIDE 5 MILLIGRAM(S): 5 TABLET ORAL at 00:59

## 2023-07-16 RX ADMIN — AMPICILLIN SODIUM AND SULBACTAM SODIUM 200 GRAM(S): 250; 125 INJECTION, POWDER, FOR SUSPENSION INTRAMUSCULAR; INTRAVENOUS at 05:11

## 2023-07-16 NOTE — ED CDU PROVIDER SUBSEQUENT DAY NOTE - ATTENDING APP SHARED VISIT CONTRIBUTION OF CARE
I agree with the PA's note and was available for any issues/concerns. I was directly involved in patient care. My brief overall assessment is as follows: left knee bursitis significantly improved, can range, ambulating improved. continue po abx. returnp recautions.
I agree with the PA's note and was available for any issues/concerns. I was directly involved in patient care. My brief overall assessment is as follows:     Slightly improved still with significant erythema and pain, will add MRSA coverage and continue IV abx and reasses
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  plan continue iv bx

## 2023-07-16 NOTE — ED CDU PROVIDER DISPOSITION NOTE - NSFOLLOWUPINSTRUCTIONS_ED_ALL_ED_FT
Prepatellar Bursitis       Prepatellar bursitis is inflammation of the prepatellar bursa, which is a fluid-filled sac that cushions the kneecap (patella). Prepatellar bursitis happens when fluid builds up in this sac and causes it to swell. The condition causes knee pain.    What are the causes?  This condition may be caused by:    Constant pressure on the knees from kneeling.  A hit to the knee.  Falling on the knee.  Infection from bacteria.  Moving the knee often in a forceful way.    What increases the risk?  You are more likely to develop this condition if:    You play sports that have a high risk of falling on the knee or being hit on the knee. These include football, wrestling, basketball, or soccer.  You do work in which you kneel for long periods of time, such as cecy, plumbing, or gardening.  You have another inflammatory condition, such as gout or rheumatoid arthritis.    What are the signs or symptoms?  The most common symptom of this condition is knee pain that gets better with rest. Other symptoms include:    Swelling on the front of the kneecap.  Warmth in the knee.  Tenderness with activity.  Redness in the knee.  Inability to bend the knee or to kneel.    How is this diagnosed?  This condition is diagnosed based on:    A physical exam. Your health care provider will compare your knees and check for tenderness and pain while moving your knee.  Your medical history.  Tests to check for infection. These may include blood tests and tests on the fluid in the bursa.  Imaging tests, such as X-ray, MRI, or ultrasound, to check for damage in the patella, or fluid buildup and swelling in the bursa.    How is this treated?  This condition may be treated by:    Resting the knee.  Putting ice on the knee.  Taking medicines, such as:    NSAIDs. These can help to reduce pain and swelling.  Antibiotics. These may be needed if you have an infection.  Steroids. These are used to reduce swelling and inflammation, and may be prescribed if other treatments are not helping.  Raising (elevating) the knee while resting.  Doing exercises to help you maintain movement (physical therapy). These may be recommended after pain and swelling improve.  Having a procedure to remove fluid from the bursa. This may be done if other treatments are not helping.  Having surgery to remove the bursa. This may be done if you have a severe infection or if the condition keeps coming back after treatment.    Follow these instructions at home:      Medicines    Take over-the-counter and prescription medicines only as told by your health care provider.  If you were prescribed an antibiotic medicine, take it as told by your health care provider. Do not stop taking the antibiotic even if you start to feel better.        Managing pain, stiffness, and swelling     If directed, put ice on the injured area.    Put ice in a plastic bag.  Place a towel between your skin and the bag.  Leave the ice on for 20 minutes, 2–3 times a day.  Elevate the injured area above the level of your heart while you are sitting or lying down.        Activity    Do not use the injured limb to support your body weight until your health care provider says that you can.  Rest your knee.  Avoid activities that cause knee pain.  Return to your normal activities as told by your health care provider. Ask your health care provider what activities are safe for you.  Do exercises as told by your health care provider.        General instructions    Ask your health care provider when it is safe for you to drive.  Do not use any products that contain nicotine or tobacco, such as cigarettes, e-cigarettes, and chewing tobacco. These can delay healing. If you need help quitting, ask your health care provider.  Keep all follow-up visits as told by your health care provider. This is important.    How is this prevented?  Warm up and stretch before being active.  Cool down and stretch after being active.  Give your body time to rest between periods of activity.  Maintain physical fitness, including strength and flexibility.  Be safe and responsible while being active. This will help you to avoid falls.  Wear knee pads if you have to kneel for a long period of time.    Contact a health care provider if:  Your symptoms do not improve or get worse.  Your symptoms keep coming back after treatment.  You develop a fever and have warmth, redness, or swelling over your knee.    Summary  Prepatellar bursitis is inflammation of the prepatellar bursa, which is a fluid-filled sac that cushions the kneecap (patella).  This condition may be caused by injury or constant pressure on the knee. It may also be caused by an infection from bacteria.  Symptoms of this condition include pain, swelling, warmth, and tenderness in the knee.  Follow instructions from your health care provider about taking medicines, resting, and doing activities.  Contact your health care provider if your symptoms do not improve, get worse, or keep coming back after treatment.    ADDITIONAL NOTES AND INSTRUCTIONS    Please follow up with your Primary MD in 24-48 hr.  Seek immediate medical care for any new/worsening signs or symptoms.

## 2023-07-16 NOTE — ED CDU PROVIDER DISPOSITION NOTE - CARE PROVIDER_API CALL
Venkata Abdi  Orthopaedic Surgery  [address] [place] [id number]  Phone: [phone]  Fax: [phone]  Follow Up Time: 4-6 Days

## 2023-07-16 NOTE — ED CDU PROVIDER SUBSEQUENT DAY NOTE - HISTORY
Pt resting comfortably at time of re-assessment. No events overnight. Pending improvement.    Will continue to monitor.
seen the pt last night some improvement as per pt able to flex and extended the knee - with left knee patellar area redness noted - No compliant over night
left knee bursitiis, placed in observation for iv abx and monitoring, pending re-eval by ortho today

## 2023-07-16 NOTE — ED CDU PROVIDER SUBSEQUENT DAY NOTE - CLINICAL SUMMARY MEDICAL DECISION MAKING FREE TEXT BOX
37 yo male no medical issues presented to the ED with left pain swelling and redness, found to have left knee bursitiis, placed in observation for iv abx and monitoring, pending re-eval by ortho today  - unasyn and doxy IV   - inflammatory markers improving.  - will repeat the labs
37 yo male no medical issues presented to the ED with left pain swelling and redness, found to have left knee bursitiis, placed in observation for iv abx and monitoring, pending re-eval by ortho today  - unasyn  - inflammatory markers improving.
37 yo male no medical issues presented to the ED with left pain swelling and redness, found to hace left knee bursitiis, placed in observation for iv abx and monitoring, pending re-eval by ortho today  - unasyn  - elevated CRP/ESR--> will continue to trend

## 2023-07-16 NOTE — ED CDU PROVIDER DISPOSITION NOTE - CLINICAL COURSE
39 y/o male with redness/swelling to prepatellar region of left knee, initially elevated inflammatory markers, treated with unasyn/doxycycline with improvement in labs and clinically. ROM improved, ambulating with minimal assistance, seen and cleared by ortho. continue po abx with f/u.

## 2023-07-16 NOTE — PROGRESS NOTE ADULT - SUBJECTIVE AND OBJECTIVE BOX
Patient seen and examined at bedside. comfortable in bed, pain controlled. Patient states his left knee feels "much better" today. No other complaints at this time. Denies numbness/tingling.    Vital Signs Last 24 Hrs  T(C): 36.8 (14 Jul 2023 07:57), Max: 36.9 (14 Jul 2023 03:15)  T(F): 98.3 (14 Jul 2023 07:57), Max: 98.5 (14 Jul 2023 03:15)  HR: 70 (14 Jul 2023 07:57) (70 - 80)  BP: 166/72 (14 Jul 2023 07:57) (100/63 - 166/72)  BP(mean): --  RR: 20 (14 Jul 2023 07:57) (16 - 20)  SpO2: 97% (14 Jul 2023 07:57) (97% - 100%)    Parameters below as of 14 Jul 2023 07:57  Patient On (Oxygen Delivery Method): room air    General: NAD  LLE: + localized erythema and swelling to anterior knee. AROM 0-80. + dorsi/plantarflexion. ext warm, cap refill brisk. compartments soft, compressible throughout. SILT.                          13.4   12.96 )-----------( 216      ( 14 Jul 2023 06:05 )             38.8     A/P: 38 y.o M with left knee bursitis, improving  D/W Dr. Abdi  - cont abx  - monitor for improvement  - ortho to follow  - ED made aware
RAPHAEL MAYO    [id number]    History:  The patient is followed for left knee bursitis. Patient is doing better today. Pain has improved. The patient is participating in physical therapy and ambulated to the bathroom easier today with crutches.  No new complaints. No acute motor or sensory changes are reported.    Vital Signs Last 24 Hrs  T(C): 36.7 (16 Jul 2023 07:17), Max: 36.9 (15 Jul 2023 15:35)  T(F): 98.1 (16 Jul 2023 07:17), Max: 98.5 (16 Jul 2023 00:00)  HR: 65 (16 Jul 2023 07:17) (65 - 87)  BP: 105/62 (16 Jul 2023 07:17) (105/62 - 115/78)  BP(mean): --  RR: 18 (16 Jul 2023 07:17) (16 - 18)  SpO2: 97% (16 Jul 2023 07:17) (96% - 98%)    Parameters below as of 16 Jul 2023 07:17  Patient On (Oxygen Delivery Method): room air      I&O's Summary                            13.3   8.87  )-----------( 269      ( 16 Jul 2023 06:38 )             39.9     07-16    140  |  101  |  13.2  ----------------------------<  109<H>  4.9   |  29.0  |  1.06    Ca    9.5      16 Jul 2023 06:38        MEDICATIONS  (STANDING):  ampicillin/sulbactam  IVPB 3 Gram(s) IV Intermittent every 6 hours  ampicillin/sulbactam  IVPB      doxycycline IVPB 100 milliGRAM(s) IV Intermittent every 12 hours    MEDICATIONS  (PRN):  acetaminophen     Tablet .. 650 milliGRAM(s) Oral every 6 hours PRN Moderate Pain (4 - 6)  ibuprofen  Tablet. 600 milliGRAM(s) Oral every 6 hours PRN Mild Pain (1 - 3)  oxyCODONE    IR 5 milliGRAM(s) Oral every 8 hours PRN Severe Pain (7 - 10)      Physical exam: Lying in bed in NAD, awake and alert- seen with  bedside with ED MD/PA  Left lower extremity- Erythema improved. Swelling decreased. Mild TTP knee. +AROM 0-90. Calf soft. No calf tenderness. Sensation to light touch is grossly intact distally. Motor function distally is 5/5. +EHL/FHL. No foot drop. 2+ dorsalis pedis pulse. Capillary refill is less than 2 seconds. No cyanosis.    Primary Orthopedic Assessment:  • Left knee bursitis, improving    Plan:   -continue WBAT- crutches for comfort  -pain control  -elevate LLE  -Ortho stable for dc home with po abx and f/u with Dr Abdi in office this week   
Patient was seen and examined at approximately 8:30    ORTHO-TRAUMA SERVICE      Pt Name: RAPHAEL MAYO    MRN: [id number]    Patient seen and examined at bedside. comfortable in bed, pain controlled. Patient states his left knee feels better compared to yesterday. Ambulating to bathroom with Crutches bedside. Reports pain with Flexion. No other complaints at this time. Denies numbness/tingling, fevers or chills    PAST MEDICAL & SURGICAL HISTORY:  PAST MEDICAL & SURGICAL HISTORY:  No pertinent past medical history      No significant past surgical history          Allergies: No Known Allergies      Medications: acetaminophen     Tablet .. 650 milliGRAM(s) Oral every 6 hours PRN  ampicillin/sulbactam  IVPB      ampicillin/sulbactam  IVPB 3 Gram(s) IV Intermittent every 6 hours  ibuprofen  Tablet. 600 milliGRAM(s) Oral every 6 hours PRN  oxyCODONE    IR 5 milliGRAM(s) Oral every 8 hours PRN                          13.4   12.96 )-----------( 216      ( 14 Jul 2023 06:05 )             38.8     07-13    140  |  102  |  12.0  ----------------------------<  88  4.4   |  26.0  |  0.93    Ca    9.4      13 Jul 2023 12:30    TPro  7.4  /  Alb  4.3  /  TBili  0.8  /  DBili  x   /  AST  27  /  ALT  19  /  AlkPhos  86  07-13      PHYSICAL EXAM:    Vital Signs Last 24 Hrs  T(C): 36.8 (15 Jul 2023 07:34), Max: 38.1 (14 Jul 2023 19:18)  T(F): 98.2 (15 Jul 2023 07:34), Max: 100.5 (14 Jul 2023 19:18)  HR: 72 (15 Jul 2023 07:34) (66 - 92)  BP: 109/60 (15 Jul 2023 07:34) (109/55 - 132/79)  BP(mean): --  RR: 18 (15 Jul 2023 07:34) (18 - 20)  SpO2: 98% (15 Jul 2023 07:34) (97% - 99%)    Parameters below as of 15 Jul 2023 07:34  Patient On (Oxygen Delivery Method): room air      Appearance: Alert, responsive, in no acute distress.    Neurological: Sensation is grossly intact to light touch. No focal deficits or weaknesses found.    LLE: + localized erythema and swelling to anterior knee. AROM 0-80. + dorsi/plantarflexion. ext warm, cap refill brisk. compartments soft, compressible throughout. Patient observed ambulating with Crutches. Able to Bearing full weight on LLE.       A/P: 38 y.o M with left knee bursitis, improving  D/W Dr. Abdi  - cont abx  - WBAT  - No Orthopedic surgical intervention

## 2023-07-16 NOTE — ED ADULT NURSE REASSESSMENT NOTE - NS ED NURSE REASSESS COMMENT FT1
Patient received A&Ox4. Complained of pain and medicated appropriately. Will continue to monitor, maintain safety, and provide support as needed.

## 2023-07-16 NOTE — ED CDU PROVIDER DISPOSITION NOTE - PATIENT PORTAL LINK FT
You can access the FollowMyHealth Patient Portal offered by [facility] by registering at the following website: [email or web address]. By joining [employer]’s FollowMyHealth portal, you will also be able to view your health information using other applications (apps) compatible with our system.

## 2023-07-16 NOTE — ED CDU PROVIDER SUBSEQUENT DAY NOTE - NSICDXFAMILYHX_GEN_ALL_CORE_FT
FAMILY HISTORY:  No pertinent family history in first degree relatives

## 2023-07-16 NOTE — ED ADULT NURSE REASSESSMENT NOTE - NS ED NURSE REASSESS COMMENT FT1
Assumed care of pt at 0715 as stated in report from RN GILBERT. Charting as noted. Patient A&O x4, denies pain/discomfort, denies CP/SOB. Updated on the plan of care. Call bell within reach, bed locked in lowest position. IV site flushed w/ NS. No redness, swelling or pain noted to site. No signs of acute distress noted, safety maintained. RR Even and unlabored.

## 2023-07-16 NOTE — ED ADULT NURSE REASSESSMENT NOTE - NS ED NURSE REASSESS COMMENT FT1
Patient received A&Ox4.  Compliant with medication. IV antibiotics administered. Complained of pain, medicated appropriately

## 2023-07-16 NOTE — ED CDU PROVIDER SUBSEQUENT DAY NOTE - NS ED ATTENDING STATEMENT MOD
This was a shared visit with the SAMIR. I reviewed and verified the documentation and independently performed the documented:

## 2023-07-18 LAB
CULTURE RESULTS: SIGNIFICANT CHANGE UP
CULTURE RESULTS: SIGNIFICANT CHANGE UP
SPECIMEN SOURCE: SIGNIFICANT CHANGE UP
SPECIMEN SOURCE: SIGNIFICANT CHANGE UP